# Patient Record
Sex: MALE | Race: WHITE | Employment: STUDENT | URBAN - METROPOLITAN AREA
[De-identification: names, ages, dates, MRNs, and addresses within clinical notes are randomized per-mention and may not be internally consistent; named-entity substitution may affect disease eponyms.]

---
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
## 2022-05-26 NOTE — PROGRESS NOTES
Aimee Engel is a 14year old male who was brought in for this visit. History was provided by the parent  HPI:   Patient presents with: Follow - Up: Dx with a concussion on 5/11/17 f/u.since then fever, chills bloody nose.   still with occasional ha, a
discussed management plan with house staff  improving lytes
discussed management plan with house staff  improving lytes
discussed management plan with house staff

## 2023-09-07 ENCOUNTER — HOSPITAL ENCOUNTER (OUTPATIENT)
Age: 23
Discharge: HOME | End: 2023-09-07
Attending: EMERGENCY MEDICINE
Payer: COMMERCIAL

## 2023-09-07 ENCOUNTER — APPOINTMENT (OUTPATIENT)
Dept: GENERAL RADIOLOGY | Age: 23
End: 2023-09-07
Attending: EMERGENCY MEDICINE
Payer: COMMERCIAL

## 2023-09-07 VITALS
SYSTOLIC BLOOD PRESSURE: 132 MMHG | TEMPERATURE: 98 F | DIASTOLIC BLOOD PRESSURE: 82 MMHG | HEART RATE: 70 BPM | OXYGEN SATURATION: 99 % | RESPIRATION RATE: 16 BRPM

## 2023-09-07 DIAGNOSIS — J40 BRONCHITIS: Primary | ICD-10-CM

## 2023-09-07 LAB — SARS-COV-2 RNA RESP QL NAA+PROBE: NOT DETECTED

## 2023-09-07 PROCEDURE — 99204 OFFICE O/P NEW MOD 45 MIN: CPT

## 2023-09-07 PROCEDURE — 99203 OFFICE O/P NEW LOW 30 MIN: CPT

## 2023-09-07 PROCEDURE — 71046 X-RAY EXAM CHEST 2 VIEWS: CPT | Performed by: EMERGENCY MEDICINE

## 2023-09-07 RX ORDER — PREDNISONE 20 MG/1
40 TABLET ORAL DAILY
Qty: 10 TABLET | Refills: 0 | Status: SHIPPED | OUTPATIENT
Start: 2023-09-07 | End: 2023-09-12

## 2023-09-07 RX ORDER — ALBUTEROL SULFATE 90 UG/1
2 AEROSOL, METERED RESPIRATORY (INHALATION) EVERY 4 HOURS PRN
Qty: 1 EACH | Refills: 0 | Status: SHIPPED | OUTPATIENT
Start: 2023-09-07 | End: 2023-10-07

## 2023-09-07 NOTE — ED INITIAL ASSESSMENT (HPI)
Pt presents with cough with phlegm and SOB. Pt reports waking with symptoms today. Pt took a home Covid Antigen Test - negative. Refused Covid PCR at this time.

## 2023-09-07 NOTE — DISCHARGE INSTRUCTIONS
Go to ER if symptoms worsen  Follow up with primary doctor for repeat imaging if indicated given abnormality on chest xray here today

## 2024-06-05 NOTE — TELEPHONE ENCOUNTER
Detail Level: Generalized Results from testing , drugs   Needs the results faxed to nursing class at [phone] Detail Level: Zone Moisturizer Recommendations: Aquaphor Patient Specific Counseling (Will Not Stick From Patient To Patient): Given samples of Eucerin roughness relief and advanced repair cream Detail Level: Detailed

## (undated) NOTE — LETTER
Name:  Marcia Fuentes School Year:  12th Grade Class: Student ID No.:   Address:  [address] [place] [id number] Phone:  [phone] (home)  :  17year old   Name Relationship [address]gl [address] Work Phone Home Phone Mobile Phone   1.  Eufemia Bills 13. Does anyone in your family have a heart problem, pacemaker, or implanted defibrillator? 12. Has anyone in your family had unexplained fainting, seizures, or near drowning?      BONE AND JOINT QUESTIONS Yes No   17. Have you ever had an injury to a b after being hit or falling? 39.Have you ever been unable to move your arms / legs after being hit /fall? 40. Have you ever become ill while exercising in the heat?     41. Do you get frequent muscle cramps when exercising? 42.  Do you or someone · Murmurs (auscultation standing, supine, +/- Valsalva)  · Location of point of maximal impulse (PMI) Yes    Pulses Yes    Lungs Yes    Abdomen Yes    Genitourinary (males only)* Yes    Skin:  HSV, lesions suggestive of MRSA, tinea corporis Yes    Neurolog may be asked to submit to testing for the presence of performance-enhancing substances in my/his/her body either during [employer] state series events or during the school day, and I/our student do/does hereby agree to submit to such testing and analysis by a ce

## (undated) NOTE — Clinical Note
5/17/2017              Librado [place]       Please excuse Tyrmilton's absence this week. He is out will a viral illness. Thank you. Sincerely,    Kortney Calles, [facility]

## (undated) NOTE — Clinical Note
5/11/2017               To whom it may concern,     Raeann Jinny was seen in our office today for a concussion. Please excuse him form gym and sports for one week. He may return on 05/18/17, If you have any questions or concerns please contact our office.

## (undated) NOTE — Clinical Note
Name:  Kelly Sampson School Year:  9th Grade Class: Student ID No.:   Address:  [place] [id number] Phone:  [phone] (home)  :  14year old   Name Relationship Lgl [address] Work Phone Home Phone Mobile Phone   1.  Bridgette Engle 15. Does anyone in your family have hypertrophic cardiomyopathy, Marfan syndrome, arrhythmogenic right ventricular cardiomyopathy, long QT syndrome, short QT syndrome, Brugada syndrome, or catecholaminergic polymorphic ventricular tachycardia?      15. Does 35. Have you ever had a hit or blow to the head that caused confusion, prolonged headache, or memory problems? 36. Do you have a history of seizure disorder?     37. Do you have headaches with exercise?      38. Have you ever had numbness, tingling, or Appearance:  Marfan stigmata (kyphoscoliosis, high-arched palate, pectus excavatum,      arachnodactyly, arm span > height, hyperlaxity, myopia, MVP, aortic insufficiency) Yes    Eyes/Ears/Nose/Throat:  Pupils equal    Hearing Yes    Lymph nodes Yes    Hea defined in the [facility] Performance-Enhancing Substance Testing Program Protocol.  We have reviewed the policy and understand that I/our student may be asked to submit to testing for the presence of performance-enhancing substances in my/his/her body either dur

## (undated) NOTE — LETTER
5/30/2018              Librado [address]        [address]         To Whom it may concern: This is to certify that Gaby Rivera had an appointment on 5/30/2018 with Markell Hubbard DO.   Please allow him to return back to s

## (undated) NOTE — LETTER
[facility] [employer] of Child Health Examination       Student's Name  Jessica Davis D Title    DO                       Date  9/3/2019   Signature Grade Level/ID#  12th Grade   HEALTH HISTORY          TO BE COMPLETED AND SIGNED BY PARENT/GUARDIAN AND VERIFIED BY HEALTH CARE PROVIDER    ALLERGIES  (Food, drug, insect, other)  Dust MEDICATION  (List all prescribed or taken on a regular basis.)    Curr PHYSICAL EXAMINATION REQUIREMENTS (head circumference if <33 years old):   /66   Pulse 67   Ht 5' 8\" (1.727 m)   Wt 60.4 kg (133 lb 3.2 oz)   BMI 20.25 kg/m²     DIABETES SCREENING  BMI>85% age/sex  No And any two of the following:  Family History Respiratory Yes                   Diagnosis of Asthma: No Mental Health Yes        Currently Prescribed Asthma Medication:            Quick-relief  medication (e.g. Short Acting Beta Antagonist): No          Controller medication (e.g. inhaled corticostero

## (undated) NOTE — MR AVS SNAPSHOT
[facility]  [phone]               Thank you for choosing us for your health care visit with Alvina Pablo. DO Stevan.   We are glad to serve you and happy to provide you with this sum

## (undated) NOTE — MR AVS SNAPSHOT
[facility]  [facility], [address]  [phone]               Thank you for choosing us for your health care visit with Jann Sorenson DO.   We are glad to serve you and happy to provide you with this summ head until all symptoms are gone and you have been cleared by your doctor. A second head injury before full recovery from the first one can lead to serious brain injury. · Avoid activities that require a lot of concentration or attention.  This will allow Caplet                   Caplet       6-11 lbs                 1.25 ml  12-17 lbs               2.5 ml  18-23 lbs 2               1 tablet  60-71 lbs                                                     2&1/2 tsp            72-95 lbs                                                     3 tsp                              3               1&1/2 table

## (undated) NOTE — MR AVS SNAPSHOT
[facility]  [facility], [address]  [phone]               Thank you for choosing us for your health care visit with Simone Pro DO.   We are glad to serve you and happy to provide you with this summary asymptomatic for at least 24 hours  Step 4 (Agility): Non-contact drills (eg, jumping, cutting, turning); proceed to next step when asymptomatic for at least 24 hours  Step 5 (“In red”): Full-contact training after medical clearance; proceed to next step w Follow these guidelines to control a nosebleed:  · Quietly comfort your child. Make sure he or she is breathing normally. · Have your child sit upright and lean his or her head forward. This will prevent the blood from pooling in the throat.  Keep a cloth Unless advised otherwise, call your child's healthcare provider if:  · Your child is 1 months old or younger and has a fever of 100.4°F (38°C) or higher. Your child may need to see a healthcare provider.   · Your child is of any age and has fevers higher th (or the nostril that is bleeding). Press close to the opening of the nostril, not up by the bridge of the nose. Press firmly enough to close off the nostril.   · Let your child sit down if he or she wants, but don’t let him or her lie down during a noseblee places that don’t normally bruise.   · Small, flat purple spots (petechiae) anywhere on your child’s body  · Pale skin or weakness anywhere in the body  · Bleeding from a second area, such as the gums  · Blood in the stool   Date Last Reviewed: 11/1/2016  © Ibuprofen/Advil/Motrin Dosing    Please dose by weight whenever possible  Ibuprofen is dosed every 6-8 hours as needed  Never give more than 4 doses in a 24 hour period  Please note the difference in the strengths between infant and children's ibuprofen [employer] access allows you to view health information for your child from their recent   visit, view other health information and more. To sign up or find more information on getting   Proxy Access to your child’s [employer] go to [email or web address]

## (undated) NOTE — Clinical Note
[address] [employer] of Child Health Examination       Student's Name  Marcial Davis Da Title                           Date    (If adding dates to the above immunization history section, put your initials by date(s) and sign here.)   ALTERNATIVE PROOF OF IMMUNITY   1 Diagnosis of asthma? Child wakes during the night coughing   Yes       No    Yes       No    Loss of function of one of paired organs? (eye/ear/kidney/testicle)   Yes       No      Birth Defects? Developmental delay?    Yes       No    Yes       No  Hospi Family History     Yes               Ethnic Minority             No                Signs of Insulin Resistance (hypertension, dyslipidemia, polycystic ovarian syndrome, acanthosis nigricans)                  No         At Risk                 No   Lead Ris Controller medication (e.g. inhaled corticosteroid):   No Other   NEEDS/MODIFICATIONS required in the school setting  None DIETARY Needs/Restrictions     None   SPECIAL INSTRUCTIONS/DEVICES e.g. safety glasses, glass eye, chest protector for arrhyt

## (undated) NOTE — LETTER
VACCINE ADMINISTRATION RECORD  PARENT / GUARDIAN APPROVAL  Date: 9/3/2019  Vaccine administered to: Mikie [facility]     : 2000    MRN: [id number]    A copy of the appropriate Centers for Disease Control and Prevention Vaccine Information statement

## (undated) NOTE — MR AVS SNAPSHOT
[facility]  [facility], [address]  [phone]               Thank you for choosing us for your health care visit with Ranelle Lanes, MD.  We are glad to serve you and happy to provide you with this summ MyChart Questions? Call [phone] for help. [employer] is NOT to be used for urgent needs. For medical emergencies, dial 911.             Educational Information     Healthy Active Living  An initiative of the American Academy of Pediatrics    Fact S Help your children form healthy habits. Healthy active children are more likely to be healthy active adults!              Visit [facility] online at  [email or web address]